# Patient Record
Sex: FEMALE | URBAN - METROPOLITAN AREA
[De-identification: names, ages, dates, MRNs, and addresses within clinical notes are randomized per-mention and may not be internally consistent; named-entity substitution may affect disease eponyms.]

---

## 2020-10-05 ENCOUNTER — APPOINTMENT (RX ONLY)
Dept: URBAN - METROPOLITAN AREA CLINIC 120 | Facility: CLINIC | Age: 60
Setting detail: DERMATOLOGY
End: 2020-10-05

## 2020-10-05 DIAGNOSIS — L74.51 PRIMARY FOCAL HYPERHIDROSIS: ICD-10-CM

## 2020-10-05 DIAGNOSIS — B02.9 ZOSTER WITHOUT COMPLICATIONS: ICD-10-CM

## 2020-10-05 PROBLEM — L74.519 PRIMARY FOCAL HYPERHIDROSIS, UNSPECIFIED: Status: ACTIVE | Noted: 2020-10-05

## 2020-10-05 PROBLEM — C44.722 SQUAMOUS CELL CARCINOMA OF SKIN OF RIGHT LOWER LIMB, INCLUDING HIP: Status: ACTIVE | Noted: 2020-10-05

## 2020-10-05 PROCEDURE — ? PRESCRIPTION

## 2020-10-05 PROCEDURE — 99203 OFFICE O/P NEW LOW 30 MIN: CPT

## 2020-10-05 PROCEDURE — ? TREATMENT REGIMEN

## 2020-10-05 PROCEDURE — ? COUNSELING

## 2020-10-05 RX ORDER — GLYCOPYRROLATE 1 MG/1
TABLET ORAL
Qty: 60 | Refills: 2 | Status: ERX | COMMUNITY
Start: 2020-10-05

## 2020-10-05 RX ORDER — VALACYCLOVIR 1 G/1
TABLET, FILM COATED ORAL
Qty: 21 | Refills: 0 | Status: ERX | COMMUNITY
Start: 2020-10-05

## 2020-10-05 RX ADMIN — VALACYCLOVIR: 1 TABLET, FILM COATED ORAL at 00:00

## 2020-10-05 RX ADMIN — GLYCOPYRROLATE: 1 TABLET ORAL at 00:00

## 2020-10-05 ASSESSMENT — LOCATION DETAILED DESCRIPTION DERM
LOCATION DETAILED: MID-OCCIPITAL SCALP
LOCATION DETAILED: V1 RIGHT ANTERIOR DERMATOME (OPHTHALMIC)

## 2020-10-05 ASSESSMENT — LOCATION ZONE DERM
LOCATION ZONE: FACE
LOCATION ZONE: SCALP

## 2020-10-05 ASSESSMENT — LOCATION SIMPLE DESCRIPTION DERM
LOCATION SIMPLE: POSTERIOR SCALP
LOCATION SIMPLE: V1 RIGHT ANTERIOR DERMATOME

## 2020-10-05 NOTE — PROCEDURE: TREATMENT REGIMEN
Initiate Treatment: glycopyrrolate 1mg bid
Detail Level: Zone
Plan: Discussed the side effects of venlafaxine. She will discuss with her prescribing physician
Detail Level: Detailed
Initiate Treatment: valacyclovir 1 gram tablet tid x 7 days
Continue Regimen: Lyrica as directed for her back pain

## 2020-11-02 ENCOUNTER — APPOINTMENT (RX ONLY)
Dept: URBAN - METROPOLITAN AREA CLINIC 120 | Facility: CLINIC | Age: 60
Setting detail: DERMATOLOGY
End: 2020-11-02

## 2020-11-02 DIAGNOSIS — L74.51 PRIMARY FOCAL HYPERHIDROSIS: ICD-10-CM | Status: WELL CONTROLLED

## 2020-11-02 DIAGNOSIS — B02.9 ZOSTER WITHOUT COMPLICATIONS: ICD-10-CM | Status: RESOLVED

## 2020-11-02 DIAGNOSIS — L73.8 OTHER SPECIFIED FOLLICULAR DISORDERS: ICD-10-CM

## 2020-11-02 PROBLEM — L74.519 PRIMARY FOCAL HYPERHIDROSIS, UNSPECIFIED: Status: ACTIVE | Noted: 2020-11-02

## 2020-11-02 PROCEDURE — 99213 OFFICE O/P EST LOW 20 MIN: CPT

## 2020-11-02 PROCEDURE — ? TREATMENT REGIMEN

## 2020-11-02 ASSESSMENT — LOCATION SIMPLE DESCRIPTION DERM
LOCATION SIMPLE: POSTERIOR SCALP
LOCATION SIMPLE: RIGHT FOREHEAD
LOCATION SIMPLE: V1 RIGHT ANTERIOR DERMATOME

## 2020-11-02 ASSESSMENT — LOCATION ZONE DERM
LOCATION ZONE: FACE
LOCATION ZONE: SCALP

## 2020-11-02 ASSESSMENT — LOCATION DETAILED DESCRIPTION DERM
LOCATION DETAILED: V1 RIGHT ANTERIOR DERMATOME (OPHTHALMIC)
LOCATION DETAILED: RIGHT MEDIAL FOREHEAD
LOCATION DETAILED: MID-OCCIPITAL SCALP

## 2020-11-02 NOTE — PROCEDURE: TREATMENT REGIMEN
Detail Level: Zone
Continue Regimen: glycopyrrolate 1mg bid
Detail Level: Detailed
Continue Regimen: Lyrica as directed for her back pain

## 2021-05-28 RX ORDER — GLYCOPYRROLATE 1 MG/1
TABLET ORAL
Qty: 60 | Refills: 0 | Status: ERX

## 2021-06-02 ENCOUNTER — APPOINTMENT (RX ONLY)
Dept: URBAN - METROPOLITAN AREA CLINIC 120 | Facility: CLINIC | Age: 61
Setting detail: DERMATOLOGY
End: 2021-06-02

## 2021-06-02 ENCOUNTER — RX ONLY (OUTPATIENT)
Age: 61
Setting detail: RX ONLY
End: 2021-06-02

## 2021-06-02 DIAGNOSIS — D18.0 HEMANGIOMA: ICD-10-CM

## 2021-06-02 DIAGNOSIS — L74.51 PRIMARY FOCAL HYPERHIDROSIS: ICD-10-CM

## 2021-06-02 PROBLEM — L74.519 PRIMARY FOCAL HYPERHIDROSIS, UNSPECIFIED: Status: ACTIVE | Noted: 2021-06-02

## 2021-06-02 PROBLEM — D23.62 OTHER BENIGN NEOPLASM OF SKIN OF LEFT UPPER LIMB, INCLUDING SHOULDER: Status: ACTIVE | Noted: 2021-06-02

## 2021-06-02 PROBLEM — D18.01 HEMANGIOMA OF SKIN AND SUBCUTANEOUS TISSUE: Status: ACTIVE | Noted: 2021-06-02

## 2021-06-02 PROCEDURE — ? TREATMENT REGIMEN

## 2021-06-02 PROCEDURE — ? COUNSELING

## 2021-06-02 PROCEDURE — 99213 OFFICE O/P EST LOW 20 MIN: CPT

## 2021-06-02 RX ORDER — GLYCOPYRROLATE 1 MG/1
TABLET ORAL
Qty: 180 | Refills: 4 | Status: ERX

## 2021-06-02 ASSESSMENT — LOCATION ZONE DERM
LOCATION ZONE: ARM
LOCATION ZONE: SCALP

## 2021-06-02 ASSESSMENT — LOCATION DETAILED DESCRIPTION DERM
LOCATION DETAILED: LEFT ANTERIOR PROXIMAL UPPER ARM
LOCATION DETAILED: MID-OCCIPITAL SCALP

## 2021-06-02 ASSESSMENT — LOCATION SIMPLE DESCRIPTION DERM
LOCATION SIMPLE: POSTERIOR SCALP
LOCATION SIMPLE: LEFT UPPER ARM

## 2021-08-09 ENCOUNTER — RX ONLY (OUTPATIENT)
Age: 61
Setting detail: RX ONLY
End: 2021-08-09

## 2021-08-09 RX ORDER — GLYCOPYRROLATE 1 MG/1
TABLET ORAL
Qty: 120 | Refills: 12 | Status: ERX

## 2022-06-02 ENCOUNTER — APPOINTMENT (RX ONLY)
Dept: URBAN - METROPOLITAN AREA CLINIC 120 | Facility: CLINIC | Age: 62
Setting detail: DERMATOLOGY
End: 2022-06-02

## 2022-06-02 DIAGNOSIS — L74.51 PRIMARY FOCAL HYPERHIDROSIS: ICD-10-CM

## 2022-06-02 DIAGNOSIS — L70.8 OTHER ACNE: ICD-10-CM

## 2022-06-02 PROBLEM — L74.519 PRIMARY FOCAL HYPERHIDROSIS, UNSPECIFIED: Status: ACTIVE | Noted: 2022-06-02

## 2022-06-02 PROCEDURE — ? TREATMENT REGIMEN

## 2022-06-02 PROCEDURE — ? COUNSELING

## 2022-06-02 PROCEDURE — 99213 OFFICE O/P EST LOW 20 MIN: CPT

## 2022-06-02 PROCEDURE — ? PRESCRIPTION

## 2022-06-02 RX ORDER — GLYCOPYRROLATE 2 MG/1
TABLET ORAL
Qty: 120 | Refills: 1 | Status: ERX | COMMUNITY
Start: 2022-06-02

## 2022-06-02 RX ADMIN — GLYCOPYRROLATE 1: 2 TABLET ORAL at 00:00

## 2022-06-02 ASSESSMENT — LOCATION SIMPLE DESCRIPTION DERM
LOCATION SIMPLE: POSTERIOR NECK
LOCATION SIMPLE: POSTERIOR SCALP

## 2022-06-02 ASSESSMENT — LOCATION ZONE DERM
LOCATION ZONE: NECK
LOCATION ZONE: SCALP

## 2022-06-02 ASSESSMENT — LOCATION DETAILED DESCRIPTION DERM
LOCATION DETAILED: MID-OCCIPITAL SCALP
LOCATION DETAILED: MID POSTERIOR NECK

## 2022-12-13 RX ORDER — GLYCOPYRROLATE 2 MG/1
TABLET ORAL
Qty: 120 | Refills: 1 | Status: ERX

## 2023-06-05 ENCOUNTER — APPOINTMENT (RX ONLY)
Dept: URBAN - METROPOLITAN AREA CLINIC 120 | Facility: CLINIC | Age: 63
Setting detail: DERMATOLOGY
End: 2023-06-05

## 2023-06-05 DIAGNOSIS — L82.1 OTHER SEBORRHEIC KERATOSIS: ICD-10-CM

## 2023-06-05 DIAGNOSIS — L74.51 PRIMARY FOCAL HYPERHIDROSIS: ICD-10-CM

## 2023-06-05 PROBLEM — L74.519 PRIMARY FOCAL HYPERHIDROSIS, UNSPECIFIED: Status: ACTIVE | Noted: 2023-06-05

## 2023-06-05 PROCEDURE — ? TREATMENT REGIMEN

## 2023-06-05 PROCEDURE — 99213 OFFICE O/P EST LOW 20 MIN: CPT

## 2023-06-05 PROCEDURE — ? COUNSELING

## 2023-06-05 PROCEDURE — ? PRESCRIPTION

## 2023-06-05 RX ORDER — OXYBUTYNIN CHLORIDE 5 MG/1
TABLET ORAL
Qty: 60 | Refills: 12 | Status: ERX | COMMUNITY
Start: 2023-06-05

## 2023-06-05 RX ADMIN — OXYBUTYNIN CHLORIDE 1: 5 TABLET ORAL at 00:00

## 2023-06-05 ASSESSMENT — LOCATION SIMPLE DESCRIPTION DERM: LOCATION SIMPLE: LEFT ANTERIOR NECK

## 2023-06-05 ASSESSMENT — LOCATION DETAILED DESCRIPTION DERM: LOCATION DETAILED: LEFT CLAVICULAR NECK

## 2023-06-05 ASSESSMENT — LOCATION ZONE DERM: LOCATION ZONE: NECK

## 2023-06-05 NOTE — HPI: FULL BODY SKIN EXAMINATION
How Severe Are Your Spot(S)?: moderate
What Type Of Note Output Would You Prefer (Optional)?: Standard Output
What Is The Reason For Today's Visit?: Full Body Skin Examination with No Concerns
What Is The Reason For Today's Visit? (Being Monitored For X): surveillance against the recurrence of atypical nevi
Additional History: Pt would like full body skin exam. Area of concern on left left chest. No history of skin cancer.

## 2023-06-05 NOTE — PROCEDURE: TREATMENT REGIMEN
Plan: Patient to talk to her PCP about possibility of venlafaxine worsening her hyperhidrosis
Detail Level: Zone
Initiate Treatment: oxybutynin chloride 5 mg tablet \\nQuantity: 60.0 Tablet\\nSig: One po bid
Discontinue Regimen: glycopyrrolate 2 mg tablet
Initiate Treatment: LN2

## 2023-08-29 ENCOUNTER — RX ONLY (OUTPATIENT)
Age: 63
Setting detail: RX ONLY
End: 2023-08-29

## 2023-08-29 RX ORDER — GLYCOPYRROLATE 2 MG/1
TABLET ORAL
Qty: 180 | Refills: 3 | Status: ERX